# Patient Record
Sex: MALE | Race: WHITE
[De-identification: names, ages, dates, MRNs, and addresses within clinical notes are randomized per-mention and may not be internally consistent; named-entity substitution may affect disease eponyms.]

---

## 2018-08-16 ENCOUNTER — HOSPITAL ENCOUNTER (OUTPATIENT)
Dept: HOSPITAL 68 - STS | Age: 68
End: 2018-08-16
Attending: THORACIC SURGERY (CARDIOTHORACIC VASCULAR SURGERY)
Payer: COMMERCIAL

## 2018-08-16 VITALS — HEIGHT: 66 IN | BODY MASS INDEX: 30.53 KG/M2 | WEIGHT: 190 LBS

## 2018-08-16 DIAGNOSIS — R91.1: ICD-10-CM

## 2018-08-16 DIAGNOSIS — Z85.528: ICD-10-CM

## 2018-08-16 DIAGNOSIS — M19.90: ICD-10-CM

## 2018-08-16 DIAGNOSIS — R91.8: Primary | ICD-10-CM

## 2018-08-16 DIAGNOSIS — I10: ICD-10-CM

## 2018-08-16 DIAGNOSIS — Z79.82: ICD-10-CM

## 2018-08-16 PROCEDURE — 87205 SMEAR GRAM STAIN: CPT

## 2018-08-16 PROCEDURE — 87070 CULTURE OTHR SPECIMN AEROBIC: CPT

## 2018-08-16 PROCEDURE — 87075 CULTR BACTERIA EXCEPT BLOOD: CPT

## 2018-08-16 NOTE — RADIOLOGY REPORT
EXAMINATION:\H\
\N\XR CHEST
 
CLINICAL INFORMATION:
Status post navigational bronchoscopy.
 
COMPARISON:
Chest x-ray dated 8/14/2018. CT scan of the chest dated 7/26/2018.
 
TECHNIQUE:
AP semierect view of the chest was obtained.
 
FINDINGS:
EKG leads overlie the chest. The cardiomediastinal silhouette is within
normal limits in size. Right hilar mass is again seen, consistent with
adenopathy.
 
A large right mid lung mass is again seen. Lungs are otherwise unremarkable
on plain film with the small pulmonary nodules seen on CT scan is not
appreciated on plain film. No pneumothorax or effusion is seen.
 
Bony structures are unremarkable.
 
IMPRESSION:
 
1. No pneumothorax status post navigational bronchoscopy.
2. Right hilar adenopathy and right midlung mass again noted.

## 2018-08-16 NOTE — RADIOLOGY REPORT
EXAMINATION:
FLUOROSCOPIC GUIDANCE FOR NAVIGATIONAL BRONCHOSCOPY
 
CLINICAL INFORMATION:
Right lung mass.
 
COMPARISON:
Chest radiograph earlier today.
 
TECHNIQUE:
4.1 minutes of fluoroscopy was provided and 6 spot films are obtained.
 
FINDINGS:
Bronchoscope is seen along with a catheter extending out into the right mid
lung lateral mass.
 
IMPRESSION:
Fluoroscopy provided for navigational bronchoscopy.

## 2018-08-16 NOTE — OPERATIVE REPORT
Operative/Inv Procedure Report
Surgery Date: 08/16/18
Name of Procedure:
Navigational bronchoscopy with fine-needle aspirate brushings forceps biopsy and
bronchoalveolar lavage
Pre-Operative Diagnosis:
Right lung mass
Post-Operative Diagnosis:
Same
Estimated Blood Loss: scant
Surgeon/Assistant:
Laquita Guzmán MD,Carl Pacheco
Anesthesia: general endotracheal tube
 
Operative/Procedure Note
Note:
After placement of monitoring lines and induction of general anesthesia survey 
bronchoscopy was done.  The endobronchial anatomy was normal.  There were no 
mucosal lesions noted.
 
The navigational system was then registered and the guider catheter was advanced
into the posterior segment of the right upper lobe to the lesion.  Brushings 
forceps biopsies and bronchoalveolar lavage were done and sent for permanent 
evaluation.
 
The guider was then advanced to the hilar lymph node adjacent to the middle lobe
bronchus and transbronchial needle aspiration biopsies were done through the 
guider catheter with a slight sent for permanent evaluation.
 
There was good hemostasis.  The patient tolerated procedure well was brought to 
recovery room awake in stable condition.
CC:
Ajay MAJOR,Vicente HAN; Lonnie MAJOR,KAL Ferrer